# Patient Record
Sex: FEMALE | Race: BLACK OR AFRICAN AMERICAN | NOT HISPANIC OR LATINO | URBAN - METROPOLITAN AREA
[De-identification: names, ages, dates, MRNs, and addresses within clinical notes are randomized per-mention and may not be internally consistent; named-entity substitution may affect disease eponyms.]

---

## 2023-03-23 ENCOUNTER — INPATIENT (INPATIENT)
Facility: HOSPITAL | Age: 38
LOS: 1 days | Discharge: HOME | DRG: 776 | End: 2023-03-25
Attending: OBSTETRICS & GYNECOLOGY | Admitting: OBSTETRICS & GYNECOLOGY
Payer: COMMERCIAL

## 2023-03-23 VITALS
OXYGEN SATURATION: 97 % | SYSTOLIC BLOOD PRESSURE: 110 MMHG | TEMPERATURE: 98 F | RESPIRATION RATE: 17 BRPM | HEART RATE: 66 BPM | DIASTOLIC BLOOD PRESSURE: 65 MMHG

## 2023-03-23 LAB
BASOPHILS # BLD AUTO: 0.04 K/UL — SIGNIFICANT CHANGE UP (ref 0–0.2)
BASOPHILS NFR BLD AUTO: 0.2 % — SIGNIFICANT CHANGE UP (ref 0–2)
COVID-19 SPIKE DOMAIN AB INTERP: POSITIVE
COVID-19 SPIKE DOMAIN ANTIBODY RESULT: >250 U/ML — HIGH
EOSINOPHIL # BLD AUTO: 0.03 K/UL — SIGNIFICANT CHANGE UP (ref 0–0.5)
EOSINOPHIL NFR BLD AUTO: 0.2 % — SIGNIFICANT CHANGE UP (ref 0–6)
HCT VFR BLD CALC: 29.4 % — LOW (ref 34.5–45)
HGB BLD-MCNC: 9.3 G/DL — LOW (ref 11.5–15.5)
IMM GRANULOCYTES NFR BLD AUTO: 2.9 % — HIGH (ref 0–0.9)
LYMPHOCYTES # BLD AUTO: 1.58 K/UL — SIGNIFICANT CHANGE UP (ref 1–3.3)
LYMPHOCYTES # BLD AUTO: 9.2 % — LOW (ref 13–44)
MCHC RBC-ENTMCNC: 28.9 PG — SIGNIFICANT CHANGE UP (ref 27–34)
MCHC RBC-ENTMCNC: 31.6 GM/DL — LOW (ref 32–36)
MCV RBC AUTO: 91.3 FL — SIGNIFICANT CHANGE UP (ref 80–100)
MONOCYTES # BLD AUTO: 0.97 K/UL — HIGH (ref 0–0.9)
MONOCYTES NFR BLD AUTO: 5.6 % — SIGNIFICANT CHANGE UP (ref 2–14)
NEUTROPHILS # BLD AUTO: 14.08 K/UL — HIGH (ref 1.8–7.4)
NEUTROPHILS NFR BLD AUTO: 81.9 % — HIGH (ref 43–77)
NRBC # BLD: 0 /100 WBCS — SIGNIFICANT CHANGE UP (ref 0–0)
PLATELET # BLD AUTO: 118 K/UL — LOW (ref 150–400)
RBC # BLD: 3.22 M/UL — LOW (ref 3.8–5.2)
RBC # FLD: 13.5 % — SIGNIFICANT CHANGE UP (ref 10.3–14.5)
SARS-COV-2 IGG+IGM SERPL QL IA: >250 U/ML — HIGH
SARS-COV-2 IGG+IGM SERPL QL IA: POSITIVE
WBC # BLD: 17.2 K/UL — HIGH (ref 3.8–10.5)
WBC # FLD AUTO: 17.2 K/UL — HIGH (ref 3.8–10.5)

## 2023-03-23 RX ORDER — ASCORBIC ACID 60 MG
500 TABLET,CHEWABLE ORAL DAILY
Refills: 0 | Status: DISCONTINUED | OUTPATIENT
Start: 2023-03-23 | End: 2023-03-25

## 2023-03-23 RX ORDER — HEPARIN SODIUM 5000 [USP'U]/ML
5000 INJECTION INTRAVENOUS; SUBCUTANEOUS EVERY 12 HOURS
Refills: 0 | Status: DISCONTINUED | OUTPATIENT
Start: 2023-03-23 | End: 2023-03-25

## 2023-03-23 RX ORDER — SODIUM CHLORIDE 9 MG/ML
1000 INJECTION, SOLUTION INTRAVENOUS
Refills: 0 | Status: DISCONTINUED | OUTPATIENT
Start: 2023-03-23 | End: 2023-03-25

## 2023-03-23 RX ORDER — FERROUS SULFATE 325(65) MG
325 TABLET ORAL THREE TIMES A DAY
Refills: 0 | Status: DISCONTINUED | OUTPATIENT
Start: 2023-03-23 | End: 2023-03-25

## 2023-03-23 RX ORDER — TETANUS TOXOID, REDUCED DIPHTHERIA TOXOID AND ACELLULAR PERTUSSIS VACCINE, ADSORBED 5; 2.5; 8; 8; 2.5 [IU]/.5ML; [IU]/.5ML; UG/.5ML; UG/.5ML; UG/.5ML
0.5 SUSPENSION INTRAMUSCULAR ONCE
Refills: 0 | Status: DISCONTINUED | OUTPATIENT
Start: 2023-03-23 | End: 2023-03-25

## 2023-03-23 RX ORDER — MAGNESIUM HYDROXIDE 400 MG/1
30 TABLET, CHEWABLE ORAL
Refills: 0 | Status: DISCONTINUED | OUTPATIENT
Start: 2023-03-23 | End: 2023-03-25

## 2023-03-23 RX ORDER — SIMETHICONE 80 MG/1
80 TABLET, CHEWABLE ORAL EVERY 4 HOURS
Refills: 0 | Status: DISCONTINUED | OUTPATIENT
Start: 2023-03-23 | End: 2023-03-25

## 2023-03-23 RX ORDER — IBUPROFEN 200 MG
600 TABLET ORAL EVERY 6 HOURS
Refills: 0 | Status: COMPLETED | OUTPATIENT
Start: 2023-03-23 | End: 2024-02-19

## 2023-03-23 RX ORDER — DIPHENHYDRAMINE HCL 50 MG
25 CAPSULE ORAL EVERY 6 HOURS
Refills: 0 | Status: DISCONTINUED | OUTPATIENT
Start: 2023-03-23 | End: 2023-03-25

## 2023-03-23 RX ORDER — OXYCODONE HYDROCHLORIDE 5 MG/1
5 TABLET ORAL
Refills: 0 | Status: COMPLETED | OUTPATIENT
Start: 2023-03-23 | End: 2023-03-30

## 2023-03-23 RX ORDER — ACETAMINOPHEN 500 MG
975 TABLET ORAL
Refills: 0 | Status: DISCONTINUED | OUTPATIENT
Start: 2023-03-23 | End: 2023-03-25

## 2023-03-23 RX ORDER — KETOROLAC TROMETHAMINE 30 MG/ML
30 SYRINGE (ML) INJECTION EVERY 6 HOURS
Refills: 0 | Status: DISCONTINUED | OUTPATIENT
Start: 2023-03-23 | End: 2023-03-24

## 2023-03-23 RX ORDER — IBUPROFEN 200 MG
600 TABLET ORAL EVERY 6 HOURS
Refills: 0 | Status: DISCONTINUED | OUTPATIENT
Start: 2023-03-23 | End: 2023-03-25

## 2023-03-23 RX ORDER — LANOLIN
1 OINTMENT (GRAM) TOPICAL EVERY 6 HOURS
Refills: 0 | Status: DISCONTINUED | OUTPATIENT
Start: 2023-03-23 | End: 2023-03-25

## 2023-03-23 RX ADMIN — Medication 600 MILLIGRAM(S): at 06:54

## 2023-03-23 RX ADMIN — Medication 600 MILLIGRAM(S): at 12:40

## 2023-03-23 RX ADMIN — Medication 975 MILLIGRAM(S): at 08:54

## 2023-03-23 RX ADMIN — Medication 975 MILLIGRAM(S): at 23:40

## 2023-03-23 RX ADMIN — Medication 600 MILLIGRAM(S): at 17:58

## 2023-03-23 RX ADMIN — Medication 600 MILLIGRAM(S): at 23:40

## 2023-03-23 RX ADMIN — HEPARIN SODIUM 5000 UNIT(S): 5000 INJECTION INTRAVENOUS; SUBCUTANEOUS at 12:40

## 2023-03-23 RX ADMIN — Medication 500 MILLIGRAM(S): at 12:40

## 2023-03-23 RX ADMIN — Medication 975 MILLIGRAM(S): at 09:46

## 2023-03-23 RX ADMIN — Medication 975 MILLIGRAM(S): at 22:56

## 2023-03-23 RX ADMIN — Medication 975 MILLIGRAM(S): at 02:30

## 2023-03-23 RX ADMIN — Medication 325 MILLIGRAM(S): at 22:56

## 2023-03-23 RX ADMIN — Medication 600 MILLIGRAM(S): at 13:21

## 2023-03-23 RX ADMIN — Medication 325 MILLIGRAM(S): at 15:22

## 2023-03-23 RX ADMIN — Medication 600 MILLIGRAM(S): at 22:56

## 2023-03-23 RX ADMIN — Medication 600 MILLIGRAM(S): at 06:10

## 2023-03-23 RX ADMIN — HEPARIN SODIUM 5000 UNIT(S): 5000 INJECTION INTRAVENOUS; SUBCUTANEOUS at 23:04

## 2023-03-23 RX ADMIN — Medication 975 MILLIGRAM(S): at 04:00

## 2023-03-23 NOTE — PROGRESS NOTE ADULT - SUBJECTIVE AND OBJECTIVE BOX
OB Progress Note: LTCS, POD#2    S: 36yo POD#2 s/p LTCS. Pain is well controlled. She is tolerating a regular diet and passing flatus. She is voiding spontaneously, and ambulating without difficulty. Endorses light vaginal bleeding, soaking <1 pad/hr. Denies CP/SOB. Denies lightheadedness/dizziness. Denies N/V.    O:  Vitals:  Vital Signs Last 24 Hrs  T(C): 36.8 (23 Mar 2023 06:02), Max: 36.8 (23 Mar 2023 06:02)  T(F): 98.2 (23 Mar 2023 06:02), Max: 98.2 (23 Mar 2023 06:02)  HR: 81 (23 Mar 2023 06:02) (66 - 81)  BP: 106/72 (23 Mar 2023 06:02) (106/72 - 110/65)  BP(mean): --  RR: 18 (23 Mar 2023 06:02) (17 - 18)  SpO2: 98% (23 Mar 2023 06:02) (97% - 98%)    Parameters below as of 23 Mar 2023 06:02  Patient On (Oxygen Delivery Method): room air        PE:  General: NAD  Heart: extremities well-perfused  Lungs: breathing normally  Abdomen: Soft, appropriately tender, incision c/d/i,   Extremities: No erythema, no pitting edema    MEDICATIONS  (STANDING):  acetaminophen     Tablet .. 975 milliGRAM(s) Oral <User Schedule>  diphtheria/tetanus/pertussis (acellular) Vaccine (Adacel) 0.5 milliLiter(s) IntraMuscular once  ibuprofen  Tablet. 600 milliGRAM(s) Oral every 6 hours  lactated ringers. 1000 milliLiter(s) (125 mL/Hr) IV Continuous <Continuous>      MEDICATIONS  (PRN):  diphenhydrAMINE 25 milliGRAM(s) Oral every 6 hours PRN Pruritus  lanolin Ointment 1 Application(s) Topical every 6 hours PRN Sore Nipples  magnesium hydroxide Suspension 30 milliLiter(s) Oral two times a day PRN Constipation  oxyCODONE    IR 5 milliGRAM(s) Oral every 3 hours PRN Moderate to Severe Pain (4-10)  simethicone 80 milliGRAM(s) Chew every 4 hours PRN Gas      Labs:  Blood type:   Rubella IgG: RPR:                        OB Progress Note: LTCS, POD#2    S: 38yo POD#2 s/p LTCS. Pain is moderately controlled. She is tolerating a regular diet and passing flatus. She is voiding spontaneously, and ambulating without difficulty. Endorses light vaginal bleeding, soaking <1 pad/hr. Denies CP/SOB. Denies lightheadedness/dizziness. Denies N/V.     O:  Vitals:  Vital Signs Last 24 Hrs  T(C): 36.8 (23 Mar 2023 06:02), Max: 36.8 (23 Mar 2023 06:02)  T(F): 98.2 (23 Mar 2023 06:02), Max: 98.2 (23 Mar 2023 06:02)  HR: 81 (23 Mar 2023 06:02) (66 - 81)  BP: 106/72 (23 Mar 2023 06:02) (106/72 - 110/65)  BP(mean): --  RR: 18 (23 Mar 2023 06:02) (17 - 18)  SpO2: 98% (23 Mar 2023 06:02) (97% - 98%)    Parameters below as of 23 Mar 2023 06:02  Patient On (Oxygen Delivery Method): room air      PE:  General: sleeping comfortably in bed, easily arousable, NAD  Heart: extremities well-perfused  Lungs: breathing normally  Abdomen: Soft, appropriately tender, incision c/d/i,   Extremities: No erythema, no pitting edema    MEDICATIONS  (STANDING):  acetaminophen     Tablet .. 975 milliGRAM(s) Oral <User Schedule>  diphtheria/tetanus/pertussis (acellular) Vaccine (Adacel) 0.5 milliLiter(s) IntraMuscular once  ibuprofen  Tablet. 600 milliGRAM(s) Oral every 6 hours  lactated ringers. 1000 milliLiter(s) (125 mL/Hr) IV Continuous <Continuous>      MEDICATIONS  (PRN):  diphenhydrAMINE 25 milliGRAM(s) Oral every 6 hours PRN Pruritus  lanolin Ointment 1 Application(s) Topical every 6 hours PRN Sore Nipples  magnesium hydroxide Suspension 30 milliLiter(s) Oral two times a day PRN Constipation  oxyCODONE    IR 5 milliGRAM(s) Oral every 3 hours PRN Moderate to Severe Pain (4-10)  simethicone 80 milliGRAM(s) Chew every 4 hours PRN Gas      Labs:  Blood type:   Rubella IgG: RPR:

## 2023-03-23 NOTE — PROGRESS NOTE ADULT - ASSESSMENT
A/P: 36yo POD#2 s/p LTCS, . Pt is a postpartum transfer from [facility] on POD2 s/p pLTCS for breech presentation/category 3 tracing in the setting of  labor/cervical insufficiency at 27w2d. Patient transferred due to baby needing a higher level of care in NICU. Patient is stable and meeting postoperative milestones.      #Routine Postpartum Care  - Continue with PO pain management  - Increase ambulation  - Continue regular diet  - Undecided regarding BC; will rediscuss in the afternoon   - Discharge planning for 3/25    Makeda Leon, PGY-1

## 2023-03-23 NOTE — H&P ADULT - HISTORY OF PRESENT ILLNESS
R1 H&P    Pt is a 36y/o  admitted as a postpartum transfer from [facility] on POD2 s/p pLTCS for breech presentation/category 3 tracing in the setting of  labor/cervical insufficiency at 27w2d. Patient transferred due to baby needing a higher level of care in NICU.    History provided from the patient, medical record, and sign out from OBGYN provider at [facility]. Prenatal course was uncomplicated until patient presented for her glucose testing on and complained of cramping found to have bulging membranes and visually dilated to 6cm. Patient was sent to L&D where breech presentation noted. Patient then developed category 3 fetal heart tracing and underwent uncomplicated pLTCS on 3/21/23, . No uterotonics given.       OBHx:   GynHx: h/o small fibroids, PCOS. denies history of STI, denies history of abnormal pap smears.  PMHx: denies history of asthma, anxiety/depression, bleeding/clotting disorder, hypertension, diabetes.  PSHx: denies prior abdominal or uterine surgery.  Med: PNV, bASA  All: NKDA  SH: Patient lives at home w/ her . Patient feels safe at home and in all her relationships. Denies use of tobacco/alcohol/drugs prior to or during pregnancy.    O:   Vitals:  Vital Signs Last 24 Hrs  T(C): --  T(F): --  HR: --  BP: --  BP(mean): --  RR: --  SpO2: --      MEDICATIONS  (STANDING):  acetaminophen     Tablet .. 975 milliGRAM(s) Oral <User Schedule>  diphtheria/tetanus/pertussis (acellular) Vaccine (Adacel) 0.5 milliLiter(s) IntraMuscular once  ibuprofen  Tablet. 600 milliGRAM(s) Oral every 6 hours  ketorolac   Injectable 30 milliGRAM(s) IV Push every 6 hours  lactated ringers. 1000 milliLiter(s) (125 mL/Hr) IV Continuous <Continuous>    MEDICATIONS  (PRN):  diphenhydrAMINE 25 milliGRAM(s) Oral every 6 hours PRN Pruritus  lanolin Ointment 1 Application(s) Topical every 6 hours PRN Sore Nipples  magnesium hydroxide Suspension 30 milliLiter(s) Oral two times a day PRN Constipation  oxyCODONE    IR 5 milliGRAM(s) Oral every 3 hours PRN Moderate to Severe Pain (4-10)  simethicone 80 milliGRAM(s) Chew every 4 hours PRN Gas      PE:  General: NAD, patient resting comfortably in bed  Abdomen: Mildly distended, appropriately tender. Fundus firm.  Incision: Clean, dry, intact covered in steri strips.  : appropriate amount of lochia on pad  Extremities: SCDs in place, no erythema

## 2023-03-23 NOTE — H&P ADULT - ASSESSMENT
A/P: 38yo POD #2 s/p pLTCS at 27w2d in the setting of  labor with breech presentation/category 3 tracing.  Patient is stable and doing well post-operatively.      #Postpartum recovery from  section  - Continue regular diet.  - Increase ambulation as tolerated.   - Standing Ibuprofen and Acetaminophen for pain, Oxycodone available PRN for severe pain.  - DVT prophylaxis with Heparin 5000u BID  - admission/post  labs including type and screen, CBC      d/w Dr. Billy Wallace PGY1

## 2023-03-23 NOTE — CHART NOTE - NSCHARTNOTEFT_GEN_A_CORE
FANTASMA BUNN Postpartum      POD#2    Vital Signs Last 24 Hrs  T(C): 36.9 (23 Mar 2023 08:54), Max: 36.9 (23 Mar 2023 08:54)  T(F): 98.4 (23 Mar 2023 08:54), Max: 98.4 (23 Mar 2023 08:54)  HR: 64 (23 Mar 2023 08:54) (64 - 81)  BP: 101/65 (23 Mar 2023 08:54) (101/65 - 110/65)  BP(mean): --  RR: 18 (23 Mar 2023 08:54) (17 - 18)  SpO2: 97% (23 Mar 2023 08:54) (97% - 98%)    Parameters below as of 23 Mar 2023 08:54  Patient On (Oxygen Delivery Method): room air                              9.3    17.20 )-----------( 118      ( 23 Mar 2023 06:38 )             29.4   baso 0.2    eos 0.2    imm gran 2.9    lymph 9.2    mono 5.6    poly 81.9         Plan: Anemia secondary to acute blood loss due to delivery.   - Ferrous Sulfate, Colace, Vitamin C supplementation.  - Monitor for signs/symptoms of anemia.  No transfusion needed at this time.  Zora PLUMMER

## 2023-03-23 NOTE — H&P ADULT - ATTENDING COMMENTS
ATTG note    Read and agree with above PGY1 note    Maternal tx from [facility] for NICU care    36 yo  now POD#2 s/p pLTCS on 3/21 for Cat 3 FHT with PTL/Breech @ 27+ wks.  Uncomplicated c/s and postop course thus far.  Pt without complaints.  Meeting postop milestones.  Transferred for higher level NICU care.    VSS, afebrile  Exam as above  Incision intact  FUndus firm    Labs - pending    36 yo  now s/p Emergent pLTCS @ 27+ wks POD#2 for transfer for higher level NICU care.  Maternal course thus far uncomplicated and pt meeint postop milestones.  -cont routine postop mngt - reg diet, po pain meds, increase ambulation  -f/u admission labs  - on contraception prior to dc    HOLLIE Cervantes

## 2023-03-24 RX ORDER — OXYCODONE HYDROCHLORIDE 5 MG/1
5 TABLET ORAL
Refills: 0 | Status: DISCONTINUED | OUTPATIENT
Start: 2023-03-24 | End: 2023-03-25

## 2023-03-24 RX ORDER — ACETAMINOPHEN 500 MG
3 TABLET ORAL
Qty: 0 | Refills: 0 | DISCHARGE
Start: 2023-03-24

## 2023-03-24 RX ORDER — OXYCODONE HYDROCHLORIDE 5 MG/1
1 TABLET ORAL
Qty: 3 | Refills: 0
Start: 2023-03-24

## 2023-03-24 RX ORDER — IBUPROFEN 200 MG
1 TABLET ORAL
Refills: 0 | DISCHARGE
Start: 2023-03-24

## 2023-03-24 RX ORDER — DIPHENHYDRAMINE HCL 50 MG
50 CAPSULE ORAL AT BEDTIME
Refills: 0 | Status: DISCONTINUED | OUTPATIENT
Start: 2023-03-24 | End: 2023-03-25

## 2023-03-24 RX ADMIN — Medication 600 MILLIGRAM(S): at 13:30

## 2023-03-24 RX ADMIN — Medication 600 MILLIGRAM(S): at 18:25

## 2023-03-24 RX ADMIN — Medication 975 MILLIGRAM(S): at 10:15

## 2023-03-24 RX ADMIN — Medication 325 MILLIGRAM(S): at 16:10

## 2023-03-24 RX ADMIN — HEPARIN SODIUM 5000 UNIT(S): 5000 INJECTION INTRAVENOUS; SUBCUTANEOUS at 12:36

## 2023-03-24 RX ADMIN — Medication 500 MILLIGRAM(S): at 09:26

## 2023-03-24 RX ADMIN — Medication 975 MILLIGRAM(S): at 15:09

## 2023-03-24 RX ADMIN — Medication 325 MILLIGRAM(S): at 09:26

## 2023-03-24 RX ADMIN — Medication 600 MILLIGRAM(S): at 05:34

## 2023-03-24 RX ADMIN — Medication 975 MILLIGRAM(S): at 09:25

## 2023-03-24 RX ADMIN — Medication 600 MILLIGRAM(S): at 12:36

## 2023-03-24 RX ADMIN — Medication 975 MILLIGRAM(S): at 20:52

## 2023-03-24 RX ADMIN — Medication 975 MILLIGRAM(S): at 16:00

## 2023-03-24 RX ADMIN — Medication 50 MILLIGRAM(S): at 01:47

## 2023-03-24 NOTE — DISCHARGE NOTE OB - MEDICATION SUMMARY - MEDICATIONS TO TAKE
I will START or STAY ON the medications listed below when I get home from the hospital:    acetaminophen 325 mg oral tablet  -- 3 tab(s) by mouth every 6 hours, As Needed  -- Indication: For pain    ibuprofen 600 mg oral tablet  -- 1 tab(s) by mouth every 6 hours  -- Indication: For pain    oxyCODONE 5 mg oral tablet  -- 1 tab(s) by mouth every 8 hours, As Needed -Moderate to Severe Pain (4-10) MDD:3  -- Indication: For severe pain

## 2023-03-24 NOTE — PROGRESS NOTE ADULT - REASON FOR ADMISSION
Postpartum transfer due to baby needing higher level of care in NICU
Postpartum transfer due to baby needing higher level of care in NICU

## 2023-03-24 NOTE — PROGRESS NOTE ADULT - ASSESSMENT
A/P: 36yo POD#3 s/p LTCS; . Pt is a postpartum transfer from [facility] on POD2 s/p pLTCS for breech presentation/category 3 tracing in the setting of  labor/cervical insufficiency at 27w2d. Patient transferred due to  needing a higher level of care in NICU. Ultimately, patient's infant passed overnight. Patient is hemodynamically stable, but w/ lethargic state.     #Routine Postpartum Care  - Continue with PO pain management  - Increase ambulation  - Continue regular diet  - Pt is s/p social work consult      Makeda Leon, PGY-1

## 2023-03-24 NOTE — DISCHARGE NOTE OB - HOSPITAL COURSE
This patient was a postpartum transfer from [facility] on POD2 s/p pLTCS for breech presentation/category 3 tracing in the setting of  labor/cervical insufficiency at 27w2d. Patient transferred due to baby needing a higher level of care in NICU.    Patient’s postoperative course was unremarkable and she remained hemodynamically stable and afebrile throughout. Upon discharge on POD3, the patient is ambulating and voiding spontaneously, tolerating oral intake, pain was well controlled with oral medication, and vital signs were stable.    Pt to f/u for postpartum care at [place]. This patient was a postpartum transfer from [facility] on POD2 s/p pLTCS for breech presentation/category 3 tracing in the setting of  labor/cervical insufficiency at 27w2d. Patient transferred due to baby needing a higher level of care in NICU.    Patient’s postoperative course was c/b fetal death on POD3 necessitating social work evaluation and intervention.  She remained hemodynamically stable and afebrile throughout. Upon discharge on POD4, the patient is ambulating and voiding spontaneously, tolerating oral intake, pain was well controlled with oral medication, and vital signs were stable.    Pt to f/u for postpartum care at [place].

## 2023-03-24 NOTE — PROGRESS NOTE ADULT - ATTENDING COMMENTS
Pt c/o incisional pain and throat pain.  Reports decreased appetite but denies nausea/vomiting  VSS  Inc C/D/I. soft  Will plan for oxycodone now for pain mgt  Spoke w pt, FOB and brother in law (brother in law was on Facetime and was called to participate in conversation by pt).  Discussed D/C planning if pain is well controlled.  Pt will F/U in 2 wks w her gyn;  As per brother in law the family is concerned about her emotional well being.  Explained that timing of discharge will be determined by pt status.  Pt to be seen by social work today.  If she desires discharge and her pain is controlled she can be discharged.  If she is not ready would anticipate discharge tomorrow.
Obstetrical  8am-6pm:  Patient seen and examined by me. Agree with above resident note. Incision clean, dry and intact.  John JIMÉNEZ

## 2023-03-24 NOTE — DISCHARGE NOTE OB - PLAN OF CARE
Make your follow-up appointment with your doctor as ordered in two weeks for wound check. No heavy lifting, driving, or strenuous activity for 6 weeks. Nothing per vagina such as tampons, intercourse, douches or tub baths for 6 weeks or until you see your doctor. Call your doctor with any signs and symptoms of infection such as fever, chills, nausea or vomiting. Call your doctor with redness or swelling at the incision site, fluid leakage or wound separation. Call your doctor if you’re unable to tolerate food, increase in vaginal bleeding, or have difficulty urinating. Call your doctor if you have pain that is not relieved by your prescribed medications. Notify your doctor with any other concerns. Call [phone] if you have any of these concerns in the next 6 weeks.

## 2023-03-24 NOTE — DISCHARGE NOTE OB - PATIENT PORTAL LINK FT
You can access the FollowMyHealth Patient Portal offered by [facility] by registering at the following website: [email or web address]. By joining [facility]’s FollowMyHealth portal, you will also be able to view your health information using other applications (apps) compatible with our system.

## 2023-03-24 NOTE — PROGRESS NOTE ADULT - SUBJECTIVE AND OBJECTIVE BOX
OB Progress Note: LTCS, POD#3    S: 38yo POD#3 s/p LTCS who experienced  death overnight. ROS limited d/t patient's emotional state. Pain is controlled. She is tolerating a regular diet and passing flatus. She is voiding spontaneously, and ambulating without difficulty.     O:  Vitals:  Vital Signs Last 24 Hrs  T(C): 36.8 (24 Mar 2023 05:20), Max: 37 (23 Mar 2023 22:30)  T(F): 98.2 (24 Mar 2023 05:20), Max: 98.6 (23 Mar 2023 22:30)  HR: 70 (24 Mar 2023 05:20) (64 - 90)  BP: 94/62 (24 Mar 2023 05:20) (94/60 - 103/66)  BP(mean): --  RR: 18 (24 Mar 2023 05:20) (18 - 18)  SpO2: 98% (24 Mar 2023 05:20) (97% - 98%)    Parameters below as of 24 Mar 2023 05:20  Patient On (Oxygen Delivery Method): room air    PE:  General: Lying in bed, lethargic, flat affect    Heart: extremities well-perfused  Lungs: breathing normally  Abdomen: Soft, appropriately tender, incision c/d/i,   Extremities: No erythema, no pitting edema    MEDICATIONS  (STANDING):  acetaminophen     Tablet .. 975 milliGRAM(s) Oral <User Schedule>  ascorbic acid 500 milliGRAM(s) Oral daily  diphtheria/tetanus/pertussis (acellular) Vaccine (Adacel) 0.5 milliLiter(s) IntraMuscular once  ferrous    sulfate 325 milliGRAM(s) Oral three times a day  heparin   Injectable 5000 Unit(s) SubCutaneous every 12 hours  ibuprofen  Tablet. 600 milliGRAM(s) Oral every 6 hours  lactated ringers. 1000 milliLiter(s) (125 mL/Hr) IV Continuous <Continuous>      MEDICATIONS  (PRN):  diphenhydrAMINE 50 milliGRAM(s) Oral at bedtime PRN Insomnia  diphenhydrAMINE 25 milliGRAM(s) Oral every 6 hours PRN Pruritus  lanolin Ointment 1 Application(s) Topical every 6 hours PRN Sore Nipples  magnesium hydroxide Suspension 30 milliLiter(s) Oral two times a day PRN Constipation  oxyCODONE    IR 5 milliGRAM(s) Oral every 3 hours PRN Moderate to Severe Pain (4-10)  simethicone 80 milliGRAM(s) Chew every 4 hours PRN Gas      Labs:  Blood type: O Positive  Rubella IgG: RPR:                           9.3<L>   17.20<H> >-----------< 118<L>    (  @ 06:38 )             29.4<L>

## 2023-03-24 NOTE — DISCHARGE NOTE OB - CARE PLAN
Principal Discharge DX:	Single delivery by  section  Assessment and plan of treatment:	Make your follow-up appointment with your doctor as ordered in two weeks for wound check. No heavy lifting, driving, or strenuous activity for 6 weeks. Nothing per vagina such as tampons, intercourse, douches or tub baths for 6 weeks or until you see your doctor. Call your doctor with any signs and symptoms of infection such as fever, chills, nausea or vomiting. Call your doctor with redness or swelling at the incision site, fluid leakage or wound separation. Call your doctor if you’re unable to tolerate food, increase in vaginal bleeding, or have difficulty urinating. Call your doctor if you have pain that is not relieved by your prescribed medications. Notify your doctor with any other concerns. Call [phone] if you have any of these concerns in the next 6 weeks.   1

## 2023-03-24 NOTE — DISCHARGE NOTE OB - NS MD DC FALL RISK RISK
For information on Fall & Injury Prevention, visit: [email or web address] OR  [email or web address] OR  https://www.cdc.gov/steadi/patient.html

## 2023-03-24 NOTE — DISCHARGE NOTE OB - CARE PROVIDER_API CALL
Darlene Villafuerte)  Obstetrics and Gynecology  [address], [address]  [place] [address]  Phone: [phone]  Fax: [phone]  Established Patient  Follow Up Time: 1 week

## 2023-03-25 VITALS
HEART RATE: 69 BPM | DIASTOLIC BLOOD PRESSURE: 71 MMHG | TEMPERATURE: 99 F | OXYGEN SATURATION: 98 % | SYSTOLIC BLOOD PRESSURE: 113 MMHG | RESPIRATION RATE: 18 BRPM

## 2023-03-25 PROCEDURE — 86769 SARS-COV-2 COVID-19 ANTIBODY: CPT

## 2023-03-25 PROCEDURE — 86901 BLOOD TYPING SEROLOGIC RH(D): CPT

## 2023-03-25 PROCEDURE — 36415 COLL VENOUS BLD VENIPUNCTURE: CPT

## 2023-03-25 PROCEDURE — 86900 BLOOD TYPING SEROLOGIC ABO: CPT

## 2023-03-25 PROCEDURE — 86850 RBC ANTIBODY SCREEN: CPT

## 2023-03-25 PROCEDURE — 85025 COMPLETE CBC W/AUTO DIFF WBC: CPT

## 2023-03-25 RX ADMIN — Medication 975 MILLIGRAM(S): at 03:00

## 2023-03-25 RX ADMIN — Medication 975 MILLIGRAM(S): at 06:00

## 2023-03-25 RX ADMIN — Medication 975 MILLIGRAM(S): at 13:15

## 2023-03-25 RX ADMIN — Medication 975 MILLIGRAM(S): at 12:45

## 2023-03-25 RX ADMIN — HEPARIN SODIUM 5000 UNIT(S): 5000 INJECTION INTRAVENOUS; SUBCUTANEOUS at 00:00

## 2023-03-25 RX ADMIN — Medication 600 MILLIGRAM(S): at 08:45

## 2023-03-25 RX ADMIN — Medication 325 MILLIGRAM(S): at 08:45

## 2023-03-25 RX ADMIN — Medication 600 MILLIGRAM(S): at 09:15

## 2023-03-25 RX ADMIN — Medication 500 MILLIGRAM(S): at 08:45

## 2023-03-25 RX ADMIN — Medication 600 MILLIGRAM(S): at 00:00

## 2023-03-25 RX ADMIN — Medication 325 MILLIGRAM(S): at 10:40

## 2023-03-26 RX ORDER — ACETAMINOPHEN 500 MG
3 TABLET ORAL
Qty: 100 | Refills: 0
Start: 2023-03-26

## 2023-03-26 RX ORDER — IBUPROFEN 200 MG
1 TABLET ORAL
Qty: 50 | Refills: 0
Start: 2023-03-26